# Patient Record
Sex: MALE | Race: WHITE | Employment: OTHER | URBAN - METROPOLITAN AREA
[De-identification: names, ages, dates, MRNs, and addresses within clinical notes are randomized per-mention and may not be internally consistent; named-entity substitution may affect disease eponyms.]

---

## 2017-02-21 PROBLEM — R22.31 FINGER MASS, RIGHT: Status: ACTIVE | Noted: 2017-02-21

## 2017-04-11 PROBLEM — M79.644 FINGER PAIN, RIGHT: Status: ACTIVE | Noted: 2017-04-11

## 2017-05-04 PROBLEM — Z47.89 ORTHOPEDIC AFTERCARE: Status: ACTIVE | Noted: 2017-05-04

## 2018-11-12 ENCOUNTER — HOSPITAL ENCOUNTER (EMERGENCY)
Facility: HOSPITAL | Age: 33
Discharge: HOME | End: 2018-11-12
Attending: EMERGENCY MEDICINE

## 2018-11-12 ENCOUNTER — APPOINTMENT (OUTPATIENT)
Dept: CT IMAGING | Facility: HOSPITAL | Age: 33
End: 2018-11-12
Attending: EMERGENCY MEDICINE

## 2018-11-12 VITALS
TEMPERATURE: 98 F | BODY MASS INDEX: 26.48 KG/M2 | RESPIRATION RATE: 15 BRPM | SYSTOLIC BLOOD PRESSURE: 127 MMHG | WEIGHT: 185 LBS | HEART RATE: 79 BPM | DIASTOLIC BLOOD PRESSURE: 78 MMHG | OXYGEN SATURATION: 100 % | HEIGHT: 70 IN

## 2018-11-12 DIAGNOSIS — R59.0 INGUINAL LYMPHADENOPATHY: Primary | ICD-10-CM

## 2018-11-12 DIAGNOSIS — D69.6 THROMBOCYTOPENIA (HCC): ICD-10-CM

## 2018-11-12 PROCEDURE — 99285 EMERGENCY DEPT VISIT HI MDM: CPT

## 2018-11-12 PROCEDURE — 80048 BASIC METABOLIC PNL TOTAL CA: CPT | Performed by: EMERGENCY MEDICINE

## 2018-11-12 PROCEDURE — 96375 TX/PRO/DX INJ NEW DRUG ADDON: CPT

## 2018-11-12 PROCEDURE — 96374 THER/PROPH/DIAG INJ IV PUSH: CPT

## 2018-11-12 PROCEDURE — 85025 COMPLETE CBC W/AUTO DIFF WBC: CPT | Performed by: EMERGENCY MEDICINE

## 2018-11-12 PROCEDURE — 74176 CT ABD & PELVIS W/O CONTRAST: CPT | Performed by: EMERGENCY MEDICINE

## 2018-11-12 RX ORDER — DIAZEPAM 5 MG/ML
5 INJECTION, SOLUTION INTRAMUSCULAR; INTRAVENOUS ONCE
Status: COMPLETED | OUTPATIENT
Start: 2018-11-12 | End: 2018-11-12

## 2018-11-12 RX ORDER — MORPHINE SULFATE 4 MG/ML
8 INJECTION, SOLUTION INTRAMUSCULAR; INTRAVENOUS ONCE
Status: COMPLETED | OUTPATIENT
Start: 2018-11-12 | End: 2018-11-12

## 2018-11-12 NOTE — ED INITIAL ASSESSMENT (HPI)
Pt having R groin pain x 2 days. Pt went to [facility] and per palpation, R inguinal hernia, unable to reduce. Dr wants to check for incarceration of hernia.

## 2018-11-12 NOTE — ED NOTES
Pt has had suicidal thoughts but no intentions the last 30 days. Pt is in a clinical trial with [facility] and has therapy with them weekly. Pt does not want help from us for this problem. Charge nurse, Susi Mcfarlane, david.

## 2018-11-13 NOTE — ED NOTES
Pt states pain much better, \"I don't feel it if no one is pressing my groin, otherwise it's still up there when the doctor was pressing. \"

## 2018-11-13 NOTE — ED PROVIDER NOTES
Patient Seen in: [facility] Emergency Department    History   Patient presents with:  Pain (neurologic)    Stated Complaint: r/o incareated hernia, sent from [facility]    HPI    21-year-old with a history of depression, anxiety, alcohol abuse presents for Abdomen: Soft, nontender, nondistended. : Painful mass in the right inguinal region that feels consistent with incarcerated inguinal hernia versus possible lymph adenopathy  Extremities: No edema.   Skin: warm and dry, no diaphoresis    ED Course     L Normal-appearing appendix. ABDOMINAL WALL:  No mass or hernia. URINARY BLADDER:  No visible focal wall thickening, lesion, or calculus. PELVIC NODES:  There are some mildly prominent lymph nodes present within the inguinal regions bilaterally.   Repr [facility]    Disposition:  Discharge  11/12/2018 10:31 pm    Follow-up:  [address] [id number]  Schedule an appointment as soon as possible for a visit in 2 days          Medications Prescribed:  Current Discharge

## (undated) NOTE — ED AVS SNAPSHOT
Yadira Isaacs   MRN: [id number]    Department:  [facility] Emergency Department   Date of Visit:  11/12/2018           Disclosure     Insurance plans vary and the physician(s) referred by the ER may not be covered by your plan.  Please contact tell this physician (or your personal doctor if your instructions are to return to your personal doctor) about any new or lasting problems. The primary care or specialist physician will see patients referred from the [facility] Emergency Department.  Simona Hinojosa